# Patient Record
Sex: MALE | Race: WHITE
[De-identification: names, ages, dates, MRNs, and addresses within clinical notes are randomized per-mention and may not be internally consistent; named-entity substitution may affect disease eponyms.]

---

## 2019-04-04 NOTE — CRLMR
HISTORY:



Lifting injury. Distal biceps rupture.



TECHNIQUE:



MRI right elbow without contrast.



COMPARISON:



None.



FINDINGS:



Tendons: Complete tear of the distal biceps tendon from the radial 

tuberosity. Tendon is retracted 7.5 cm from the radial tuberosity. Fluid 

between the torn end of the tendon and the radial tuberosity. Distal 

brachialis tendon is intact. Distal triceps tendon is intact. Common flexor 

and common extensor tendons are intact. Small amount of non loculated fluid 

along the superficial margin of the forearm flexor musculature. Mild edema 

in the superficial aspect of the forearm flexor musculature. 



Ligaments: Ulnar collateral ligament is intact. Radial collateral and 

lateral ulnar collateral the nodes are intact.



Joint space: No cartilage defects. Physiologic quantity of joint fluid. No 

joint bodies.



Bones: No fracture. No bone lesion or marrow replacing process.



Nerves: Ulnar nerve is mildly enlarged in the cubital tunnel which can be 

seen with ulnar neuritis. Median and radial nerves are normal.



Other: Subcutaneous edema medially. No fluid collection. 



IMPRESSION:



1. Complete tear of the distal biceps tendon from the radial tuberosity 

with 7.5 cm tendon retraction. 



2. Soft tissue edema medially. No fluid collection.



Dictated by Lexa Baez MD @ Apr 4 2019  9:30AM



Signed by Dr. Lexa Baez @ Apr 4 2019  9:37AM

## 2019-04-10 NOTE — PCM.OPNOTE
- General Post-Op/Procedure Note


Date of Surgery/Procedure: 04/08/19


Operative Procedure(s): Repair right distal biceps tendon


Findings: 





Complete rupture of right distal biceps tendon


Pre Op Diagnosis: Right distal biceps tendon rupture


Post-Op Diagnosis: Same


Anesthesia Technique: Moderate Sedation, Regional Block


Surgical Drain/Tube Type: Self Contained


Complications: None


Condition: Good


Free Text/Narrative:: 





Indications: Jimenez is a right hand dominant male who sustained an injury to his 

right elbow while lifting resulting in rupture of the right distal biceps. This 

is confirmed by MRI. He is now brought to the operating room for repair of 

right distal biceps tendon. Risks, benefits and potential complications were 

discussed.





procedure: After adequate general anesthesia is obtained tourniquet was placed 

about the right upper arm right arm and hand are prepped and draped in a 

sterile fashion. Transverse incision is made just distal to the elbow crease 

and carried down through the subcutaneous tissues. Care is taken to preserve 

the large vein. The fascia is divided and the nerve is identified and 

protected.  biceps tendon isnot identified within the sheath at the 

antecubitalspace. Blunt dissection is carried up proximally where the ruptured 

end of the tendon is found retracted. This was delivered into the incision. 

This reveals complete rupture with some hemorrhage around the end of the 

tendon. the end of the tendon is debrided to fit through an 8 mm spacer without 

difficulty and rather snugly and a 7 mm diameter. Whipstitch was then placed in 

the end of the tendon.





Blunt dissection is then carried out distally down to the radial tuberosity. 

Radial tuberosity is cleared of soft tissue with an elevator. Forearm is held 

in supination.Guidepin is then drilled across both cortices of the radius.An 8 

mm reamer was then placed over the guidepin and drilled through the near 

cortex. he sutures from the end of the tendon are then passed through an 

Arthrex button with each suture going in opposite directions. Button is then 

placed on the end of the insertion tool and used to pass it through the tunnel 

and out the hole in the far cortex. The button is secured against the far 

cortex and the sutures are then tightened, pulling the distal end of the biceps 

into the tunnel.Arm is flexed slightly sutures are tightened and tied. One of 

the sutures is then passed through the tendon with a free needle and sutures 

are tied once again. Due to the snug fit of the tendon within the tunnel 

decision was made not to use the interference screw. The wound was then 

irrigated.The fascia was closed with 0 Vicryl. Skin was closed with 2-0 Vicryl 

and a running 3-0 Monocryl. Edges of the incision and the elbow were injected 

with 0.5% Marcaine. Sterile dressing was applied.  Long-arm fiberglass 

posterior splint is then applied. Patient tolerated tl there were no 

complications.

## 2021-03-20 ENCOUNTER — HOSPITAL ENCOUNTER (EMERGENCY)
Dept: HOSPITAL 60 - LB.ED | Age: 51
Discharge: NURSING, REHAB OR LONG-TERM CARE | End: 2021-03-20
Payer: COMMERCIAL

## 2021-03-20 VITALS — DIASTOLIC BLOOD PRESSURE: 84 MMHG | SYSTOLIC BLOOD PRESSURE: 149 MMHG

## 2021-03-20 VITALS — HEART RATE: 73 BPM
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
## 2021-03-20 DIAGNOSIS — Z79.899: ICD-10-CM

## 2021-03-20 DIAGNOSIS — S80.211A: ICD-10-CM

## 2021-03-20 DIAGNOSIS — J45.909: ICD-10-CM

## 2021-03-20 DIAGNOSIS — S52.591A: Primary | ICD-10-CM
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
## 2021-03-20 DIAGNOSIS — R07.9: ICD-10-CM

## 2021-03-20 NOTE — PCM.EKG
** #1 Interpretation


EKG Date: 03/20/21


Time: 06:56


Rhythm: NSR


Axis: Normal


P-Wave: Present


QRS: Normal


ST-T: Normal


QT: Normal


Comparison: NA - No Prior EKG
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
## 2021-03-21 NOTE — CR
Date of Service:  03/20/21

Clinical Data:  pain



AP CHEST:  



No priors.



The patient has taken a poor inspiration.



The heart size is within normal limits.  



The lungs are clear.  No pneumothorax.  No pleural effusion.  



No evidence of acute intrathoracic disease.  



[id number]

[facility]

## 2021-03-21 NOTE — CR
Date of Service:  03/20/21

Clinical Data:  pain, swelling



RIGHT WRIST: 



No priors.  



There is an impacted, comminuted, displaced fracture through the distal radius, 
the proximal carpal row and distal fracture fragments are dislocated posteriorly
with respect to the distal radius.  



No other acute abnormalities.  



IMPRESSION:  Fracture dislocation right wrist.  



[id number]

[facility]

## 2021-03-24 ENCOUNTER — HOSPITAL ENCOUNTER (OUTPATIENT)
Dept: HOSPITAL 11 - JP.SDS | Age: 51
Discharge: HOME | End: 2021-03-24
Attending: ORTHOPAEDIC SURGERY
Payer: COMMERCIAL

## 2021-03-24 VITALS — SYSTOLIC BLOOD PRESSURE: 147 MMHG | DIASTOLIC BLOOD PRESSURE: 90 MMHG | HEART RATE: 64 BPM
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
## 2021-03-24 DIAGNOSIS — J45.909: ICD-10-CM

## 2021-03-24 DIAGNOSIS — Z79.899: ICD-10-CM

## 2021-03-24 DIAGNOSIS — S20.211A: ICD-10-CM

## 2021-03-24 DIAGNOSIS — S52.511A: Primary | ICD-10-CM

## 2021-03-24 DIAGNOSIS — K21.9: ICD-10-CM

## 2021-03-24 DIAGNOSIS — S63.024A: ICD-10-CM

## 2021-03-24 PROCEDURE — 80053 COMPREHEN METABOLIC PANEL: CPT

## 2021-03-24 PROCEDURE — 25608 OPTX DST RD XART FX/EPI SEP2: CPT

## 2021-03-24 PROCEDURE — C1713 ANCHOR/SCREW BN/BN,TIS/BN: HCPCS

## 2021-03-24 PROCEDURE — 36415 COLL VENOUS BLD VENIPUNCTURE: CPT

## 2021-03-24 PROCEDURE — 76000 FLUOROSCOPY <1 HR PHYS/QHP: CPT

## 2021-03-24 PROCEDURE — 85027 COMPLETE CBC AUTOMATED: CPT

## 2021-03-24 PROCEDURE — 25332 REVISE WRIST JOINT: CPT
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
## 2023-01-08 ENCOUNTER — HOSPITAL ENCOUNTER (OUTPATIENT)
Dept: HOSPITAL 11 - JP.SDS | Age: 53
Discharge: HOME | End: 2023-01-08
Attending: FAMILY MEDICINE
Payer: COMMERCIAL

## 2023-01-08 VITALS — SYSTOLIC BLOOD PRESSURE: 132 MMHG | DIASTOLIC BLOOD PRESSURE: 80 MMHG

## 2023-01-08 VITALS — HEART RATE: 71 BPM

## 2023-01-08 DIAGNOSIS — G43.909: ICD-10-CM

## 2023-01-08 DIAGNOSIS — Z12.11: Primary | ICD-10-CM

## 2023-01-08 DIAGNOSIS — D12.3: ICD-10-CM

## 2023-01-08 DIAGNOSIS — D12.4: ICD-10-CM

## 2023-01-08 DIAGNOSIS — K44.9: ICD-10-CM

## 2023-01-08 DIAGNOSIS — Z91.011: ICD-10-CM

## 2023-01-08 DIAGNOSIS — J45.909: ICD-10-CM

## 2023-01-08 DIAGNOSIS — K21.00: ICD-10-CM

## 2023-01-08 DIAGNOSIS — Z79.899: ICD-10-CM
